# Patient Record
Sex: FEMALE | Race: WHITE | NOT HISPANIC OR LATINO | URBAN - METROPOLITAN AREA
[De-identification: names, ages, dates, MRNs, and addresses within clinical notes are randomized per-mention and may not be internally consistent; named-entity substitution may affect disease eponyms.]

---

## 2023-09-19 ENCOUNTER — RESEARCH ENCOUNTER (OUTPATIENT)
Dept: ONCOLOGY | Facility: HOSPITAL | Age: 63
End: 2023-09-19
Payer: COMMERCIAL

## 2023-09-19 ENCOUNTER — APPOINTMENT (OUTPATIENT)
Dept: WOMENS IMAGING | Facility: HOSPITAL | Age: 63
End: 2023-09-19
Payer: COMMERCIAL

## 2023-09-19 PROCEDURE — 77067 SCR MAMMO BI INCL CAD: CPT | Performed by: RADIOLOGY

## 2023-09-19 PROCEDURE — 77063 BREAST TOMOSYNTHESIS BI: CPT | Performed by: RADIOLOGY

## 2023-09-19 NOTE — RESEARCH
Informed Consent worksheet for the protocol: [employer] 20-08 Prospective Case Collection Study for [employer]    Consent version: IRB approval dated February 1, 2023  Protocol version: 7.0   Date:28-April 2022, IRB approval 5/27/2022  Subject -[id number]      The patient was pre screened by phone call and identified as a candidate for the [employer] 20-08 Prospective Case Collection Study for [employer], and voiced interest in participating in the study.    The following people were present at the consent conference: Location at the [facility]  Patient: Tiffanie SAUCEDO DEE   : Balbina Marks   Other:       The following was discussed with the patient prior to signing the informed consent.    The study purpose   Procedures, including identification of an experimental investigational device  Duration of study participation  New Information  Risks and Discomforts  Any known side effects when applicable    Alternative treatment  Benefits   Costs/Payment   Patient's accountability and responsibilities    The voluntary nature of research participants     Right to withdraw consent    The withdrawal process    Confidentiality   Authorization to use and disclose information for research purposes - Including who can view information and the types of information shared.    The patient was informed of what to do in case of an illness or injury resulting from the study and who to contact for more trial information, or information on rights and welfare as a research volunteer.     The patient was given opportunity for questions. The patient verbalizes understanding and is willing to sign the informed consent.     After all questions were satisfied the patient signed the informed consent and a copy of the signed main informed consent form were given to the patient.    No study-specific procedures were completed prior to patient signing study informed consent  form(s)    The   is aware of the subject's participation status.    Balbina Marks   Clinical Research Coordinator

## 2024-05-10 ENCOUNTER — TELEPHONE (OUTPATIENT)
Dept: INTERNAL MEDICINE | Facility: CLINIC | Age: 64
End: 2024-05-10

## 2024-05-10 NOTE — TELEPHONE ENCOUNTER
Called patient and left voice mail for patient to call our office back at [phone] to schedule fasting lab appointment 5 days prior to CPE on 07/25/2024.

## 2024-05-10 NOTE — TELEPHONE ENCOUNTER
Caller: Tiffanie PRICE    Relationship: Self    Best call back number: [phone]     What orders are you requesting (i.e. lab or imaging): LABS    In what timeframe would the patient need to come in: RESCHEDULE LABS WEEK BEFORE 7/25/24    Where will you receive your lab/imaging services: IN HOUSE    Additional notes: PATIENT REQUESTS CALL BACK TO SCHEDULE LABS.

## 2024-07-22 DIAGNOSIS — Z00.00 ROUTINE HEALTH MAINTENANCE: Primary | ICD-10-CM

## 2024-07-23 LAB
ALBUMIN SERPL-MCNC: 4.5 G/DL (ref 3.5–5.2)
ALBUMIN/GLOB SERPL: 1.7 G/DL
ALP SERPL-CCNC: 81 U/L (ref 39–117)
ALT SERPL-CCNC: 19 U/L (ref 1–33)
APPEARANCE UR: CLEAR
AST SERPL-CCNC: 22 U/L (ref 1–32)
BACTERIA #/AREA URNS HPF: NORMAL /HPF
BASOPHILS # BLD AUTO: 0.03 10*3/MM3 (ref 0–0.2)
BASOPHILS NFR BLD AUTO: 0.6 % (ref 0–1.5)
BILIRUB SERPL-MCNC: 2 MG/DL (ref 0–1.2)
BILIRUB UR QL STRIP: NEGATIVE
BUN SERPL-MCNC: 16 MG/DL (ref 8–23)
BUN/CREAT SERPL: 19.3 (ref 7–25)
CALCIUM SERPL-MCNC: 10.2 MG/DL (ref 8.6–10.5)
CASTS URNS MICRO: NORMAL
CHLORIDE SERPL-SCNC: 104 MMOL/L (ref 98–107)
CHOLEST SERPL-MCNC: 171 MG/DL (ref 0–200)
CO2 SERPL-SCNC: 28.6 MMOL/L (ref 22–29)
COLOR UR: YELLOW
CREAT SERPL-MCNC: 0.83 MG/DL (ref 0.57–1)
EGFRCR SERPLBLD CKD-EPI 2021: 79.8 ML/MIN/1.73
EOSINOPHIL # BLD AUTO: 0.17 10*3/MM3 (ref 0–0.4)
EOSINOPHIL NFR BLD AUTO: 3.2 % (ref 0.3–6.2)
EPI CELLS #/AREA URNS HPF: NORMAL /HPF
ERYTHROCYTE [DISTWIDTH] IN BLOOD BY AUTOMATED COUNT: 12.1 % (ref 12.3–15.4)
GLOBULIN SER CALC-MCNC: 2.6 GM/DL
GLUCOSE SERPL-MCNC: 96 MG/DL (ref 65–99)
GLUCOSE UR QL STRIP: NEGATIVE
HCT VFR BLD AUTO: 41.7 % (ref 34–46.6)
HDLC SERPL-MCNC: 56 MG/DL (ref 40–60)
HGB BLD-MCNC: 14 G/DL (ref 12–15.9)
HGB UR QL STRIP: NEGATIVE
IMM GRANULOCYTES # BLD AUTO: 0 10*3/MM3 (ref 0–0.05)
IMM GRANULOCYTES NFR BLD AUTO: 0 % (ref 0–0.5)
KETONES UR QL STRIP: NEGATIVE
LDLC SERPL CALC-MCNC: 102 MG/DL (ref 0–100)
LDLC/HDLC SERPL: 1.81 {RATIO}
LEUKOCYTE ESTERASE UR QL STRIP: ABNORMAL
LYMPHOCYTES # BLD AUTO: 2.61 10*3/MM3 (ref 0.7–3.1)
LYMPHOCYTES NFR BLD AUTO: 49.4 % (ref 19.6–45.3)
MCH RBC QN AUTO: 31.1 PG (ref 26.6–33)
MCHC RBC AUTO-ENTMCNC: 33.6 G/DL (ref 31.5–35.7)
MCV RBC AUTO: 92.7 FL (ref 79–97)
MONOCYTES # BLD AUTO: 0.35 10*3/MM3 (ref 0.1–0.9)
MONOCYTES NFR BLD AUTO: 6.6 % (ref 5–12)
NEUTROPHILS # BLD AUTO: 2.12 10*3/MM3 (ref 1.7–7)
NEUTROPHILS NFR BLD AUTO: 40.2 % (ref 42.7–76)
NITRITE UR QL STRIP: NEGATIVE
NRBC BLD AUTO-RTO: 0 /100 WBC (ref 0–0.2)
PH UR STRIP: 8 [PH] (ref 5–8)
PLATELET # BLD AUTO: 345 10*3/MM3 (ref 140–450)
POTASSIUM SERPL-SCNC: 5.4 MMOL/L (ref 3.5–5.2)
PROT SERPL-MCNC: 7.1 G/DL (ref 6–8.5)
PROT UR QL STRIP: NEGATIVE
RBC # BLD AUTO: 4.5 10*6/MM3 (ref 3.77–5.28)
RBC #/AREA URNS HPF: NORMAL /HPF
SODIUM SERPL-SCNC: 138 MMOL/L (ref 136–145)
SP GR UR STRIP: 1.01 (ref 1–1.03)
TRIGL SERPL-MCNC: 69 MG/DL (ref 0–150)
TSH SERPL DL<=0.005 MIU/L-ACNC: 0.97 UIU/ML (ref 0.27–4.2)
UROBILINOGEN UR STRIP-MCNC: ABNORMAL MG/DL
VLDLC SERPL CALC-MCNC: 13 MG/DL (ref 5–40)
WBC # BLD AUTO: 5.28 10*3/MM3 (ref 3.4–10.8)
WBC #/AREA URNS HPF: NORMAL /HPF

## 2024-07-23 NOTE — PROGRESS NOTES
Blood work reviewed.  Will discuss at upcoming appointment.  Plan to recheck potassium at appointment.

## 2024-07-25 ENCOUNTER — OFFICE VISIT (OUTPATIENT)
Dept: INTERNAL MEDICINE | Facility: CLINIC | Age: 64
End: 2024-07-25
Payer: COMMERCIAL

## 2024-07-25 VITALS
HEIGHT: 64 IN | BODY MASS INDEX: 23.63 KG/M2 | WEIGHT: 138.4 LBS | HEART RATE: 97 BPM | OXYGEN SATURATION: 98 % | DIASTOLIC BLOOD PRESSURE: 70 MMHG | SYSTOLIC BLOOD PRESSURE: 104 MMHG

## 2024-07-25 DIAGNOSIS — Z00.00 ANNUAL PHYSICAL EXAM: Primary | ICD-10-CM

## 2024-07-25 DIAGNOSIS — E87.5 HYPERKALEMIA: ICD-10-CM

## 2024-07-25 DIAGNOSIS — I42.2: ICD-10-CM

## 2024-07-25 PROCEDURE — 90715 TDAP VACCINE 7 YRS/> IM: CPT | Performed by: STUDENT IN AN ORGANIZED HEALTH CARE EDUCATION/TRAINING PROGRAM

## 2024-07-25 PROCEDURE — 90471 IMMUNIZATION ADMIN: CPT | Performed by: STUDENT IN AN ORGANIZED HEALTH CARE EDUCATION/TRAINING PROGRAM

## 2024-07-25 PROCEDURE — 99396 PREV VISIT EST AGE 40-64: CPT | Performed by: STUDENT IN AN ORGANIZED HEALTH CARE EDUCATION/TRAINING PROGRAM

## 2024-07-25 NOTE — PROGRESS NOTES
Amado Roberts M.D.  Internal Medicine  [facility]  [address], [address]  [place] [address]  [phone]      Chief Complaint  Annual Exam    SUBJECTIVE    History of Present Illness    Tiffanie PRICE is a 63 y.o. female who presents to the office today as an established patient that last saw me on 5/18/2023.     Hypertrophic cardiomyopathy-follows with Dr. Hernandez.  She had a normal echo but parasternal views were suboptimal.  She had a cardiac MRI which was normal.  Recommended echo every 3 to 5 years. Planning to schedule follow up.     Denies exercise intolerance, syndope. Has an apple Watch that monitors for A fib.     Was eating potatoes and asparagus nightly.     Under some stress. Daughter had twins and tripletts. Grandaughter has cancer. Quit her job for [employer]. Mood is good. Recently .  supportive. No anhedonia.      Has flatulence with cheese and sweet foods. Tried Lactaid.     Review of Systems    No Known Allergies     Outpatient Medications Marked as Taking for the 7/25/24 encounter (Office Visit) with Amado Roberts MD   Medication Sig Dispense Refill    calcium carbonate-vitamin d 600-400 MG-UNIT per tablet Take 1 tablet by mouth Daily.      Cetirizine HCl 10 MG capsule Take  by mouth.      montelukast (SINGULAIR) 10 MG tablet       Multiple Vitamin (MULTIVITAMIN) tablet Take 1 tablet by mouth.          Past Medical History:   Diagnosis Date    Allergic 1/2020    Colon polyp     Not sure of date    H/O seasonal allergies      Past Surgical History:   Procedure Laterality Date    BREAST SURGERY Right     breast biopsy    COLONOSCOPY  Oct 2021     Family History   Problem Relation Age of Onset    Heart disease Mother     Vision loss Mother         Macular degeneration    Hypertension Mother     Cancer Father         Leukemia    Rheum arthritis Sister     Osteoarthritis Sister     Heart disease Brother         HCM    reports that she has never smoked. She has  "never used smokeless tobacco. She reports current alcohol use of about 4.0 standard drinks of alcohol per week. She reports that she does not use drugs.    OBJECTIVE    Vital Signs:   /70   Pulse 97   Ht 162.6 cm (64.02\")   Wt 62.8 kg (138 lb 6.4 oz)   SpO2 98%   BMI 23.74 kg/m²     Physical Exam  Constitutional:       Appearance: Normal appearance. She is normal weight.   Cardiovascular:      Rate and Rhythm: Normal rate and regular rhythm.      Heart sounds: Normal heart sounds. No murmur heard.  Pulmonary:      Effort: Pulmonary effort is normal.      Breath sounds: Normal breath sounds.   Abdominal:      General: Abdomen is flat. There is no distension.      Palpations: Abdomen is soft.      Tenderness: There is no abdominal tenderness.   Musculoskeletal:      Right lower leg: No edema.      Left lower leg: No edema.   Skin:     General: Skin is warm and dry.   Neurological:      Mental Status: She is alert.   Psychiatric:         Mood and Affect: Mood normal.         Behavior: Behavior normal.         Thought Content: Thought content normal.            The following data was reviewed by: Amado Roberts MD on 07/25/2024:  CMP          7/22/2024    10:03   CMP   Glucose 96    BUN 16    Creatinine 0.83    Sodium 138    Potassium 5.4    Chloride 104    Calcium 10.2    Total Protein 7.1    Albumin 4.5    Globulin 2.6    Total Bilirubin 2.0    Alkaline Phosphatase 81    AST (SGOT) 22    ALT (SGPT) 19    BUN/Creatinine Ratio 19.3      CBC w/diff          7/22/2024    10:03   CBC w/Diff   WBC 5.28    RBC 4.50    Hemoglobin 14.0    Hematocrit 41.7    MCV 92.7    MCH 31.1    MCHC 33.6    RDW 12.1    Platelets 345    Neutrophil Rel % 40.2    Lymphocyte Rel % 49.4    Monocyte Rel % 6.6    Eosinophil Rel % 3.2    Basophil Rel % 0.6      Lipid Panel          7/22/2024    10:03   Lipid Panel   Total Cholesterol 171    Triglycerides 69    HDL Cholesterol 56    VLDL Cholesterol 13    LDL Cholesterol  102    LDL/HDL " Ratio 1.81      TSH          7/22/2024    10:03   TSH   TSH 0.972        Data reviewed : Cardiology note, cardiac MRI          The 10-year ASCVD risk score (Humberto CANALES, et al., 2019) is: 2.4%    Values used to calculate the score:      Age: 63 years      Sex: Female      Is Non- : No      Diabetic: No      Tobacco smoker: No      Systolic Blood Pressure: 104 mmHg      Is BP treated: No      HDL Cholesterol: 56 mg/dL      Total Cholesterol: 171 mg/dL     ASSESSMENT & PLAN     Diagnoses and all orders for this visit:    1. Annual physical exam (Primary)    2. Hypertrophic cardiomyopathy with genetic marker    3. Hyperkalemia  -     Comprehensive Metabolic Panel    Other orders  -     Tdap Vaccine => 6yo IM (BOOSTRIX/ADACEL)        #Annual Preventative Health Examination   -Age and sex appropriate physical exam performed and documented. Updated past medical, family, social and surgical histories as well as allergies and care team list. Addressed care gaps listed in the medical record.  -Encouraged annual dental and vision exams as part of their overall health.  -Encouraged minimum of 30 minutes or more of exercise at a brisk walk or higher 5 days per week combined with a well-balanced diet.  -Immunizations reviewed and updated in EMR.  -Lipid screening:    Patient is over age 40 and a 10-year ASCVD risk was calculated which does not indicate a need for statin therapy.   -Aspirin for primary or secondary prevention: ASCVD risk calculate and aspirin for primary prevention is not indicated.  -Depression screening: PHQ2 performed and the patient's screen was negative.  -Diabetes screening:    Screening not indicated at this time.   -Hypertension screening: Patient screened negative for HTN today.  -Hepatitis C virus screening:  Patient has already completed Hepatitis C screening. Negative screening on file.   -Colon cancer screening: Patient is already up to date on their colon cancer screening with  colonoscopy is indicated again in 2026  -Cervical cancer screening: Follows with GYN  -Breast cancer screening:Follows with GYN.  She is in a study.  DEXA scan in September 2022 showed osteopenia       Health Maintenance Due   Topic Date Due    ZOSTER VACCINE (1 of 2) Never done    MAMMOGRAM  08/03/2023    COVID-19 Vaccine (4 - 2023-24 season) 09/01/2023    ANNUAL PHYSICAL  05/18/2024        Follow Up  Return in about 1 year (around 7/25/2025) for Annual physical.    Patient/family had no further questions at this time and verbalized understanding of the plan discussed today.

## 2024-07-26 LAB
ALBUMIN SERPL-MCNC: 4.6 G/DL (ref 3.5–5.2)
ALBUMIN/GLOB SERPL: 2 G/DL
ALP SERPL-CCNC: 81 U/L (ref 39–117)
ALT SERPL-CCNC: 17 U/L (ref 1–33)
AST SERPL-CCNC: 22 U/L (ref 1–32)
BILIRUB SERPL-MCNC: 1.3 MG/DL (ref 0–1.2)
BUN SERPL-MCNC: 20 MG/DL (ref 8–23)
BUN/CREAT SERPL: 22.5 (ref 7–25)
CALCIUM SERPL-MCNC: 9.8 MG/DL (ref 8.6–10.5)
CHLORIDE SERPL-SCNC: 103 MMOL/L (ref 98–107)
CO2 SERPL-SCNC: 24 MMOL/L (ref 22–29)
CREAT SERPL-MCNC: 0.89 MG/DL (ref 0.57–1)
EGFRCR SERPLBLD CKD-EPI 2021: 73.4 ML/MIN/1.73
GLOBULIN SER CALC-MCNC: 2.3 GM/DL
GLUCOSE SERPL-MCNC: 109 MG/DL (ref 65–99)
POTASSIUM SERPL-SCNC: 4.2 MMOL/L (ref 3.5–5.2)
PROT SERPL-MCNC: 6.9 G/DL (ref 6–8.5)
SODIUM SERPL-SCNC: 137 MMOL/L (ref 136–145)

## 2024-09-04 ENCOUNTER — TELEPHONE (OUTPATIENT)
Dept: CARDIOLOGY | Facility: CLINIC | Age: 64
End: 2024-09-04

## 2024-09-04 NOTE — TELEPHONE ENCOUNTER
Caller: Tiffanie PRICE    Relationship: Self    Best call back number:     [phone] (Mobile)       What orders are you requesting (i.e. lab or imaging): EKG    In what timeframe would the patient need to come in: JANUARY 2025     Where will you receive your lab/imaging services: [facility]     Additional notes: PT WAS LAST SEEN 4/24/23 AND ORDERED TO RETURN IN A YEAR FOR REPEAT EKG (APRIL 2024). SHE WOULD LIKE TO PUSH THIS TO JANUARY 2025 FOR DEDUCTIBLE REASONS. CAN YOU PLEASE PLACE ORDERS AND CALL PT TO SCHEDULE TESTING AND F/U FOR JAN 2025 TIMEFRAME.

## 2024-09-04 NOTE — TELEPHONE ENCOUNTER
Reviewed recommendations with Tiffanie PRICE and scheduled patient for f/u in Jan 2025.    Thank you,  Sarahy KENDRICK RN  Triage Nurse [facility]  09/04/24 12:29 EDT

## 2024-09-04 NOTE — TELEPHONE ENCOUNTER
Chart review shows the following:        Dr. Hernandez,  Patient just needs a 1 year f/u scheduled correct? Ok for patient to be seen in January 2025 as long as she is asymptomatic?    Please let me know how you would like to proceed.    Thank you,  Sarahy KENDRICK RN  Triage Nurse [facility]  09/04/24  12:11 EDT

## 2024-10-01 ENCOUNTER — APPOINTMENT (OUTPATIENT)
Dept: WOMENS IMAGING | Facility: HOSPITAL | Age: 64
End: 2024-10-01
Payer: COMMERCIAL

## 2024-10-01 PROCEDURE — 77063 BREAST TOMOSYNTHESIS BI: CPT | Performed by: RADIOLOGY

## 2024-10-01 PROCEDURE — 77067 SCR MAMMO BI INCL CAD: CPT | Performed by: RADIOLOGY

## 2024-10-08 ENCOUNTER — TELEPHONE (OUTPATIENT)
Dept: ORTHOPEDIC SURGERY | Facility: CLINIC | Age: 64
End: 2024-10-08
Payer: COMMERCIAL

## 2024-10-08 NOTE — TELEPHONE ENCOUNTER
CALLED PATIENT TO MAKE AN APPOINTMENT FOR HIP PAIN, PER DR TIERNEY. DR TIERNEY WOULD LIKE TO SEE PATIENT AT [facility] IN NOVEMBER. IF PATIENT CALLS BACK , TRANSFER HER TO THE OFFICE

## 2024-11-21 ENCOUNTER — OFFICE VISIT (OUTPATIENT)
Dept: ORTHOPEDIC SURGERY | Facility: CLINIC | Age: 64
End: 2024-11-21
Payer: COMMERCIAL

## 2024-11-21 VITALS
SYSTOLIC BLOOD PRESSURE: 92 MMHG | BODY MASS INDEX: 23.56 KG/M2 | DIASTOLIC BLOOD PRESSURE: 64 MMHG | HEIGHT: 64 IN | WEIGHT: 138 LBS

## 2024-11-21 DIAGNOSIS — M25.551 RIGHT HIP PAIN: Primary | ICD-10-CM

## 2024-11-21 DIAGNOSIS — M70.61 TROCHANTERIC BURSITIS OF RIGHT HIP: ICD-10-CM

## 2024-11-21 DIAGNOSIS — M76.01 GLUTEAL TENDONITIS OF RIGHT BUTTOCK: ICD-10-CM

## 2024-11-21 NOTE — PROGRESS NOTES
Subjective:     Patient ID: Tiffanie PRICE is a 64 y.o. female.    Chief Complaint:  Right hip pain, new issue    History of Present Illness  History of Present Illness  Tiffanie presents to clinic today for evaluation of her right lateral hip pain states her pain started.  Intensively about 2 to 3 months ago localized to the lateral aspect of the hip and into the posterior lateral aspect of the proximal hip in particular.  She said that she noted some improvement with lactose reduction and stretching activities but is still noted intermittent episodes where her pain does significantly exacerbate particular with sitting and walking greater than 5000 steps or driving for long periods of time.  She denies any jocy numbness or tingling, does note some occasional radiation of pain down the anterior lateral aspect of the right leg below the level of the knee.  Does note some intermittent tingling though this has improved as well to the right lower extremity.  Denies any lower lumbar pain at this point in time-does have history of sciatica type symptoms but these have improved significantly with stretching which she does on a regular basis.  She has not had any recent physical therapy or injections.  She does get some improvement with intermittent anti-inflammatory medications including ibuprofen.  She does note some occasional feelings of burning type pain as well.     Social History     Occupational History    Not on file   Tobacco Use    Smoking status: Never     Passive exposure: Never    Smokeless tobacco: Never   Vaping Use    Vaping status: Never Used   Substance and Sexual Activity    Alcohol use: Yes     Alcohol/week: 4.0 standard drinks of alcohol     Types: 3 Glasses of wine, 1 Shots of liquor per week    Drug use: Never    Sexual activity: Yes     Partners: Male     Birth control/protection: Post-menopausal      Past Medical History:   Diagnosis Date    Allergic 1/2020    Colon polyp     Not sure of date    H/O  "seasonal allergies      Past Surgical History:   Procedure Laterality Date    BREAST SURGERY Right     breast biopsy    COLONOSCOPY  Oct 2021       Family History   Problem Relation Age of Onset    Heart disease Mother     Vision loss Mother         Macular degeneration    Hypertension Mother     Cancer Father         Leukemia    Rheum arthritis Sister     Osteoarthritis Sister     Heart disease Brother         HCM         Review of Systems        Objective:  Vitals:    11/21/24 1120   BP: 92/64   Weight: 62.6 kg (138 lb)   Height: 162.6 cm (64.02\")         11/21/24  1120   Weight: 62.6 kg (138 lb)     Body mass index is 23.67 kg/m².  Physical Exam    Vital signs reviewed.   General: No acute distress, alert and oriented  Eyes: conjunctiva clear; pupils equally round and reactive  ENT: external ears and nose atraumatic; oropharynx clear  CV: no peripheral edema  Resp: normal respiratory effort  Skin: no rashes or wounds; normal turgor  Psych: mood and affect appropriate; recent and remote memory intact          Physical Exam         Right hip-maximal tenderness palpation localized laterally over the trochanteric bursa extending to the posterior lateral region, negative logroll and Stinchfield exam to the hip joint itself.  Negative straight leg raise and contralateral straight leg raise right lower extremity with no focal tenderness in the midline or paraspinal regions of the lower lumbar spine.  Positive Bharti's test.  Positive sensation light touch all distributions right lower extremity symmetric to the left.  Brisk cap refill all digits, 2+ [place] pedis pulse.    Imaging:  Right Hip X-Ray  Indication: Pain  AP pelvis and Frog Leg views    Findings:  No fracture  No bony lesion  Normal soft tissues  Mild femoral- acetabular joint space narrowing    No prior studies were available for comparison.    Assessment:        1. Right hip pain    2. Trochanteric bursitis of right hip    3. Gluteal tendonitis of right " buttock           Plan:          Assessment & Plan  Discussed with patient that her symptoms appear to be mostly coming from her trochanteric bursa as well as her gluteal tendons extending back to the posterior lateral aspect of the hip and a little more proximal from this region.  She does have some lower lumbar pathology which is well noted and may be creating some intermittent feelings of radicular type symptoms that she is describing as burning.  At this point in time organ to continue with home exercises which I have given to her today, she sees no improvement with this we will consider formal physical therapy after the first of the year as well as injection options.  Could also consider MRI of both lumbar spine and hip to evaluate for more focal pathology that would identify her pain origin.  Patient does want to hold off on that at this time.    Tiffanie SAUCEDO PRICE was in agreement with plan and had all questions answered.     Orders:  Orders Placed This Encounter   Procedures    XR Hip With or Without Pelvis 2 - 3 View Right       Medications:  No orders of the defined types were placed in this encounter.      Followup:  Return if symptoms worsen or fail to improve.    Diagnoses and all orders for this visit:    1. Right hip pain (Primary)  -     XR Hip With or Without Pelvis 2 - 3 View Right    2. Trochanteric bursitis of right hip    3. Gluteal tendonitis of right buttock          BMI is within normal parameters. No other follow-up for BMI required.       Dictated utilizing Dragon dictation     Patient or patient representative verbalized consent for the use of Ambient Listening during the visit with  Juan Pablo Lyon MD for chart documentation. 12/15/2024  11:34 EST

## 2025-01-27 ENCOUNTER — OFFICE VISIT (OUTPATIENT)
Dept: CARDIOLOGY | Facility: CLINIC | Age: 65
End: 2025-01-27
Payer: COMMERCIAL

## 2025-01-27 VITALS
OXYGEN SATURATION: 98 % | HEART RATE: 83 BPM | DIASTOLIC BLOOD PRESSURE: 66 MMHG | HEIGHT: 64 IN | SYSTOLIC BLOOD PRESSURE: 100 MMHG | WEIGHT: 138.6 LBS | BODY MASS INDEX: 23.66 KG/M2

## 2025-01-27 DIAGNOSIS — I42.2 AUTOSOMAL DOMINANT FAMILIAL HYPERTROPHIC CARDIOMYOPATHY TYPE 4 ASSOCIATED WITH MUTATION IN MYBPC3 GENE: Primary | ICD-10-CM

## 2025-01-27 PROCEDURE — 93000 ELECTROCARDIOGRAM COMPLETE: CPT | Performed by: STUDENT IN AN ORGANIZED HEALTH CARE EDUCATION/TRAINING PROGRAM

## 2025-01-27 PROCEDURE — 99214 OFFICE O/P EST MOD 30 MIN: CPT | Performed by: STUDENT IN AN ORGANIZED HEALTH CARE EDUCATION/TRAINING PROGRAM

## 2025-01-27 NOTE — PROGRESS NOTES
[facility]    Subjective:     Encounter Date:01/27/25      Patient ID: Tiffanie PRICE is a 64 y.o. female.    Chief Complaint:   Chief Complaint   Patient presents with    Hypertrophic cardiomyopathy with genetic marker     Patient is in the office today for her 1 year follow up appointment.       History of Present Illness    Ms. Harmon is a pleasant 62-year-old lady without significant past medical history who presents to [facility].  She reports that her brother was diagnosed with hypertrophic cardiomyopathy at the age of 59.  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  She has several siblings and half of them tested positive for the gene mutation.    She has adult children who are also undergoing screening.  One of them is an athlete and is undergoing testing at [facility].    She denies any cardiac symptoms.  She denies any palpitations, shortness of breath, chest pain.  She states that overall she has been pretty healthy most of her life.  She tries to live a healthy lifestyle and her cholesterol panel most recently obtained was normal.  She reports no family history of sudden cardiac death.  Her brother was diagnosed with hypertrophic cardiomyopathy because it sounds like he was having issues with atrial fibrillation and dyspnea on exertion.  He has a defibrillator and is being managed at [facility].  She is never had any syncope or unexplained passing out spells.    Previous cardiac testing:  I reviewed her echocardiogram images directly from February 7, 2023.  -There is adequate visualization of the majority myocardial segments, however parasternal views were suboptimal.    The following portions of the patient's history were reviewed and updated as appropriate: allergies, current medications, past family history, past medical history, past social history, past surgical history and problem list.    Past Medical History:  "  Diagnosis Date    Allergic 1/2020    Colon polyp     Not sure of date    H/O seasonal allergies        Past Surgical History:   Procedure Laterality Date    BREAST SURGERY Right     breast biopsy    COLONOSCOPY  Oct 2021           ECG 12 Lead    Date/Time: 1/27/2025 3:28 PM  Performed by: Jem Hernandez MD    Authorized by: Jem Hernandez MD  Comparison: compared with previous ECG from 4/24/2023  Similar to previous ECG  Rhythm: sinus rhythm  Rate: normal  Conduction: conduction normal  ST Segments: ST segments normal  T Waves: T waves normal  QRS axis: right  Other: no other findings    Clinical impression: normal ECG  Comments: Borderline right axis deviation otherwise normal ECG.             Objective:     Vitals:    01/27/25 1455   BP: 100/66   BP Location: Left arm   Patient Position: Sitting   Cuff Size: Adult   Pulse: 83   SpO2: 98%   Weight: 62.9 kg (138 lb 9.6 oz)   Height: 162.6 cm (64.02\")         Constitutional:       Appearance: Healthy appearance. Not in distress.   Neck:      Vascular: JVD normal.   Pulmonary:      Effort: Pulmonary effort is normal.      Breath sounds: Normal breath sounds.   Cardiovascular:      PMI at left midclavicular line. Normal rate. Regular rhythm. Normal S2.       Murmurs: There is no murmur.      Comments: No murmurs appreciated.  Benign cardiovascular exam.  Pulses:     Intact distal pulses.   Edema:     Peripheral edema absent.   Skin:     General: Skin is warm and dry.   Neurological:      General: No focal deficit present.      Mental Status: Alert, oriented to person, place, and time and oriented to person, place and time.   Psychiatric:         Mood and Affect: Mood and affect normal.         Lab Review:     Lipid Panel          7/22/2024    10:03   Lipid Panel   Total Cholesterol 171    Triglycerides 69    HDL Cholesterol 56    VLDL Cholesterol 13    LDL Cholesterol  102    LDL/HDL Ratio 1.81      BUN   Date Value Ref Range Status   07/25/2024 20 8 - 23 mg/dL Final "     Creatinine   Date Value Ref Range Status   07/25/2024 0.89 0.57 - 1.00 mg/dL Final     Potassium   Date Value Ref Range Status   07/25/2024 4.2 3.5 - 5.2 mmol/L Final     ALT (SGPT)   Date Value Ref Range Status   07/25/2024 17 1 - 33 U/L Final     AST (SGOT)   Date Value Ref Range Status   07/25/2024 22 1 - 32 U/L Final         Performed        Assessment:          Diagnosis Plan   1. 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  Counseling provided  Her echocardiogram was unremarkable, but the parasternal views were suboptimal.  I directly reviewed this echo again today from February 2023.  She underwent echo CMR in 2023 which showed no evidence of LGE and otherwise was normal.  Will check an echocardiogram next year for 3-year interval echo follow-up.  Otherwise, continue echo at 3 to 5-year intervals given her negative phenotypic expression  She has no symptoms of any cardiac conditions at this time.  NYHA class I.   Brother was diagnosed at age 59   She has adult children who have been screened, and some also expressed the mutation and are under appropriate care  No high risk features noted on family history, or patient personal history.       RTC 1 year with EKG and echo    Jem Hernandez MD  [facility]  01/27/25  08:02 EDT       Current Outpatient Medications:     calcium carbonate-vitamin d 600-400 MG-UNIT per tablet, Take 1 tablet by mouth Daily., Disp: , Rfl:     Cetirizine HCl 10 MG capsule, Take  by mouth., Disp: , Rfl:     montelukast (SINGULAIR) 10 MG tablet, , Disp: , Rfl:     Multiple Vitamin (MULTIVITAMIN) tablet, Take 1 tablet by mouth., Disp: , Rfl:          Return in about 1 year (around 1/27/2026).      Part of this  note may be an electronic transcription/translation of spoken language to printed text using the Dragon Dictation System.

## 2025-01-27 NOTE — PATIENT INSTRUCTIONS
We will get an echo test we see you back next year.  Otherwise, it looks like your heart is doing fine.

## 2025-08-05 ENCOUNTER — OFFICE VISIT (OUTPATIENT)
Dept: INTERNAL MEDICINE | Facility: CLINIC | Age: 65
End: 2025-08-05
Payer: COMMERCIAL

## 2025-08-05 VITALS
DIASTOLIC BLOOD PRESSURE: 60 MMHG | HEIGHT: 64 IN | HEART RATE: 80 BPM | OXYGEN SATURATION: 98 % | WEIGHT: 136 LBS | RESPIRATION RATE: 16 BRPM | SYSTOLIC BLOOD PRESSURE: 120 MMHG | BODY MASS INDEX: 23.22 KG/M2

## 2025-08-05 DIAGNOSIS — E73.9 LACTOSE INTOLERANCE: ICD-10-CM

## 2025-08-05 DIAGNOSIS — C44.90 SKIN CANCER: ICD-10-CM

## 2025-08-05 DIAGNOSIS — Z23 NEED FOR SHINGLES VACCINE: ICD-10-CM

## 2025-08-05 DIAGNOSIS — R14.0 BLOATING: ICD-10-CM

## 2025-08-05 DIAGNOSIS — M85.80 OSTEOPENIA, UNSPECIFIED LOCATION: ICD-10-CM

## 2025-08-05 DIAGNOSIS — J30.89 ENVIRONMENTAL AND SEASONAL ALLERGIES: ICD-10-CM

## 2025-08-05 DIAGNOSIS — I42.2: ICD-10-CM

## 2025-08-05 DIAGNOSIS — R15.2 FECAL URGENCY: ICD-10-CM

## 2025-08-05 DIAGNOSIS — Z00.00 ANNUAL PHYSICAL EXAM: Primary | ICD-10-CM

## 2025-08-05 PROCEDURE — 90750 HZV VACC RECOMBINANT IM: CPT | Performed by: STUDENT IN AN ORGANIZED HEALTH CARE EDUCATION/TRAINING PROGRAM

## 2025-08-05 PROCEDURE — 99213 OFFICE O/P EST LOW 20 MIN: CPT | Performed by: STUDENT IN AN ORGANIZED HEALTH CARE EDUCATION/TRAINING PROGRAM

## 2025-08-05 PROCEDURE — 90471 IMMUNIZATION ADMIN: CPT | Performed by: STUDENT IN AN ORGANIZED HEALTH CARE EDUCATION/TRAINING PROGRAM

## 2025-08-05 PROCEDURE — 99396 PREV VISIT EST AGE 40-64: CPT | Performed by: STUDENT IN AN ORGANIZED HEALTH CARE EDUCATION/TRAINING PROGRAM
